# Patient Record
Sex: FEMALE | Race: ASIAN | Employment: UNEMPLOYED | URBAN - METROPOLITAN AREA
[De-identification: names, ages, dates, MRNs, and addresses within clinical notes are randomized per-mention and may not be internally consistent; named-entity substitution may affect disease eponyms.]

---

## 2017-01-06 ENCOUNTER — TELEPHONE (OUTPATIENT)
Dept: FAMILY MEDICINE CLINIC | Age: 47
End: 2017-01-06

## 2017-01-13 NOTE — TELEPHONE ENCOUNTER
Spoke with pt regarding results. Pt states that she has already done the treatment in 2007. Pt would like to know what the next step is since she already had treatment. Please advise.

## 2017-01-13 NOTE — TELEPHONE ENCOUNTER
We really need documentation of latent TB treatment and as long as she remains asymptomatic, this should be enough   To submit to immigration service proof of treatment along with positive Quant TB testing results.